# Patient Record
Sex: MALE | Race: WHITE | NOT HISPANIC OR LATINO | URBAN - METROPOLITAN AREA
[De-identification: names, ages, dates, MRNs, and addresses within clinical notes are randomized per-mention and may not be internally consistent; named-entity substitution may affect disease eponyms.]

---

## 2017-08-02 ENCOUNTER — OFFICE VISIT (OUTPATIENT)
Dept: RETAIL CLINIC | Facility: CLINIC | Age: 52
End: 2017-08-02

## 2017-08-02 DIAGNOSIS — Z02.83 ENCOUNTER FOR DRUG SCREENING: Primary | ICD-10-CM

## 2023-09-26 ENCOUNTER — TELEPHONE (OUTPATIENT)
Dept: FAMILY MEDICINE CLINIC | Facility: CLINIC | Age: 58
End: 2023-09-26

## 2023-09-26 NOTE — TELEPHONE ENCOUNTER
Caller: Bismark Ramsay    Relationship to patient: Self    Best call back number: [phone]    Chief complaint: ESTABLISHING CARE WITH DR. TEJADA    Type of visit: NEW PATIENT    Requested date: ASAP    If rescheduling, when is the original appointment: N/A     Additional notes: THE PATIENT STATES THAT HE WAS REFERRED TO DR. TEJADA BY HIS FORMER PROVIDER, DR. BISMARK PAINTING. THE PATIENT WOULD LIKE TO KNOW IF DR. TEJADA WOULD BE WILLING TO TAKE HIM ON. THE HUB EXPLAINED THAT DR. TEJADA IS NOT CURRENTLY ACCEPTING NEW PATIENTS. PLEASE ADVISE.